# Patient Record
Sex: MALE
[De-identification: names, ages, dates, MRNs, and addresses within clinical notes are randomized per-mention and may not be internally consistent; named-entity substitution may affect disease eponyms.]

---

## 2020-09-29 ENCOUNTER — APPOINTMENT (OUTPATIENT)
Dept: FAMILY MEDICINE | Facility: CLINIC | Age: 35
End: 2020-09-29
Payer: COMMERCIAL

## 2020-09-29 ENCOUNTER — NON-APPOINTMENT (OUTPATIENT)
Age: 35
End: 2020-09-29

## 2020-09-29 VITALS
DIASTOLIC BLOOD PRESSURE: 73 MMHG | OXYGEN SATURATION: 100 % | RESPIRATION RATE: 18 BRPM | HEART RATE: 58 BPM | TEMPERATURE: 97.9 F | BODY MASS INDEX: 29.22 KG/M2 | WEIGHT: 240 LBS | SYSTOLIC BLOOD PRESSURE: 121 MMHG | HEIGHT: 76 IN

## 2020-09-29 DIAGNOSIS — Z78.9 OTHER SPECIFIED HEALTH STATUS: ICD-10-CM

## 2020-09-29 DIAGNOSIS — Z87.39 PERSONAL HISTORY OF OTHER DISEASES OF THE MUSCULOSKELETAL SYSTEM AND CONNECTIVE TISSUE: ICD-10-CM

## 2020-09-29 DIAGNOSIS — S43.409A UNSPECIFIED SPRAIN OF UNSPECIFIED SHOULDER JOINT, INITIAL ENCOUNTER: ICD-10-CM

## 2020-09-29 DIAGNOSIS — Z82.49 FAMILY HISTORY OF ISCHEMIC HEART DISEASE AND OTHER DISEASES OF THE CIRCULATORY SYSTEM: ICD-10-CM

## 2020-09-29 DIAGNOSIS — Z00.00 ENCOUNTER FOR GENERAL ADULT MEDICAL EXAMINATION W/OUT ABNORMAL FINDINGS: ICD-10-CM

## 2020-09-29 DIAGNOSIS — R79.89 OTHER SPECIFIED ABNORMAL FINDINGS OF BLOOD CHEMISTRY: ICD-10-CM

## 2020-09-29 DIAGNOSIS — R35.0 FREQUENCY OF MICTURITION: ICD-10-CM

## 2020-09-29 DIAGNOSIS — Z83.3 FAMILY HISTORY OF DIABETES MELLITUS: ICD-10-CM

## 2020-09-29 DIAGNOSIS — M25.569 PAIN IN UNSPECIFIED KNEE: ICD-10-CM

## 2020-09-29 PROCEDURE — 93000 ELECTROCARDIOGRAM COMPLETE: CPT | Mod: 59

## 2020-09-29 PROCEDURE — G0444 DEPRESSION SCREEN ANNUAL: CPT

## 2020-09-29 PROCEDURE — 99385 PREV VISIT NEW AGE 18-39: CPT | Mod: 25

## 2020-09-30 PROBLEM — Z00.00 ENCOUNTER FOR PREVENTIVE HEALTH EXAMINATION: Status: ACTIVE | Noted: 2017-03-01

## 2020-09-30 PROBLEM — R79.89 ABNORMAL THYROID BLOOD TEST: Status: ACTIVE | Noted: 2020-09-29

## 2020-09-30 PROBLEM — R35.0 FREQUENT URINATION: Status: ACTIVE | Noted: 2020-09-30

## 2020-09-30 NOTE — HISTORY OF PRESENT ILLNESS
[FreeTextEntry1] : MABLE MASCORRO is a 34 year old male who presents to establish care \par  [de-identified] : Pt is requesting to have his annual physical done today, since he does not have time to take off from work. He had his blood work done and we were able to review it . His thyroid levels are abnormal, he will follow up with a thyroid US. He denies any complaint of fatigue, palpitations. He also has a high LDL, we discussed this at length, he will follow a low fat diet and we will repeat in 3 months. \par He is c/o of frequent urination and he was concerned that this may be a sign of diabetes, his A1c is wnl. we discussed. \par He is also c/o of constipation, we discussed a high fiber diet and adding a daily dose of MiraLAX.

## 2020-09-30 NOTE — HISTORY OF PRESENT ILLNESS
[FreeTextEntry1] : MABLE MASCORRO is a 34 year old male who presents to establish care \par  [de-identified] : Pt is requesting to have his annual physical done today, since he does not have time to take off from work. He had his blood work done and we were able to review it . His thyroid levels are abnormal, he will follow up with a thyroid US. He denies any complaint of fatigue, palpitations. He also has a high LDL, we discussed this at length, he will follow a low fat diet and we will repeat in 3 months. \par He is c/o of frequent urination and he was concerned that this may be a sign of diabetes, his A1c is wnl. we discussed. \par He is also c/o of constipation, we discussed a high fiber diet and adding a daily dose of MiraLAX.

## 2020-09-30 NOTE — PLAN
[FreeTextEntry1] : Spirometry not done \par EKG done, results reviewed and discussed with patient. refereed to cardiology \par Hearing test done, results reviewed and discussed with patient.\par We spent sufficient time to discuss aspects of care; questions were answered  to patient's satisfaction.The diagnosis and care plan were discussed with patient in detail.  Patient test results were  reviewed and explained in full. All questions were answered.\par

## 2020-09-30 NOTE — HEALTH RISK ASSESSMENT
[Patient declined Low Dose CT Scan] : Patient declined Low Dose CT Scan [No Retinopathy] : No retinopathy [Reviewed no changes] : Reviewed no changes [Good] : good [Active tobbaco user] : Patient is not a tobacco user [No falls in past year] : Patient reported no falls in the past year [0] : 2) Feeling down, depressed, or hopeless: Not at all [de-identified] : none  [Patient refused mammogram] : Patient refused mammogram [Patient refused bone density test] : Patient refused bone density test [Patient refused colonoscopy] : Patient refused colonoscopy [Patient refused PAP Smear] : Patient refused PAP Smear [Reviewed and Current] :  reviewed and current [Change in mental status noted] : No change in mental status noted [Language] : denies difficulty with language [Takes medication as prescribed] : Patient does not take medication as prescribed [None] : None [With Family] : lives with family [Employed] : employed [# Of Children ___] : has [unfilled] children [] :  [Sexually Active] : sexually active [Feels Safe at Home] : Feels safe at home [Fully functional (bathing, dressing, toileting, transferring, walking, feeding)] : Fully functional (bathing, dressing, toileting, transferring, walking, feeding) [Fully functional (using the telephone, shopping, preparing meals, housekeeping, doing laundry, using transportation,] : Fully functional and needs no help or supervision to perform IADLs (using the telephone, shopping, preparing meals, housekeeping, doing laundry, using transportation, managing medications and managing finances) [Reports changes in hearing] : Reports no changes in hearing [Reports changes in dental health] : Reports no changes in dental health [Reports changes in vision] : Reports no changes in vision [Carbon Monoxide Detector] : carbon monoxide detector [Smoke Detector] : smoke detector [Safety elements used in home] : no safety elements used in home [Guns at Home] : no guns at home [Sunscreen] : uses sunscreen [Seat Belt] :  uses seat belt [Travel to Developing Areas] : does not  travel to developing areas [TB Exposure] : is not being exposed to tuberculosis [Caregiver Concerns] : does not have caregiver concerns

## 2020-09-30 NOTE — HEALTH RISK ASSESSMENT
[Patient declined Low Dose CT Scan] : Patient declined Low Dose CT Scan [Reviewed no changes] : Reviewed no changes [No Retinopathy] : No retinopathy [Good] : good [Active tobbaco user] : Patient is not a tobacco user [No falls in past year] : Patient reported no falls in the past year [0] : 2) Feeling down, depressed, or hopeless: Not at all [de-identified] : none  [Patient refused mammogram] : Patient refused mammogram [Patient refused bone density test] : Patient refused bone density test [Patient refused colonoscopy] : Patient refused colonoscopy [Patient refused PAP Smear] : Patient refused PAP Smear [Reviewed and Current] :  reviewed and current [Change in mental status noted] : No change in mental status noted [Language] : denies difficulty with language [Takes medication as prescribed] : Patient does not take medication as prescribed [With Family] : lives with family [None] : None [Employed] : employed [] :  [# Of Children ___] : has [unfilled] children [Sexually Active] : sexually active [Fully functional (bathing, dressing, toileting, transferring, walking, feeding)] : Fully functional (bathing, dressing, toileting, transferring, walking, feeding) [Feels Safe at Home] : Feels safe at home [Fully functional (using the telephone, shopping, preparing meals, housekeeping, doing laundry, using transportation,] : Fully functional and needs no help or supervision to perform IADLs (using the telephone, shopping, preparing meals, housekeeping, doing laundry, using transportation, managing medications and managing finances) [Reports changes in hearing] : Reports no changes in hearing [Reports changes in vision] : Reports no changes in vision [Reports changes in dental health] : Reports no changes in dental health [Smoke Detector] : smoke detector [Carbon Monoxide Detector] : carbon monoxide detector [Guns at Home] : no guns at home [Safety elements used in home] : no safety elements used in home [Seat Belt] :  uses seat belt [Sunscreen] : uses sunscreen [Travel to Developing Areas] : does not  travel to developing areas [TB Exposure] : is not being exposed to tuberculosis [Caregiver Concerns] : does not have caregiver concerns

## 2020-09-30 NOTE — COUNSELING
[Sleep ___ hours/day] : Sleep [unfilled] hours/day [Behavioral health counseling provided] : Behavioral health counseling provided [Engage in a relaxing activity] : Engage in a relaxing activity [Plan in advance] : Plan in advance